# Patient Record
Sex: FEMALE | Race: OTHER | HISPANIC OR LATINO | URBAN - METROPOLITAN AREA
[De-identification: names, ages, dates, MRNs, and addresses within clinical notes are randomized per-mention and may not be internally consistent; named-entity substitution may affect disease eponyms.]

---

## 2022-04-10 ENCOUNTER — EMERGENCY (EMERGENCY)
Facility: HOSPITAL | Age: 47
LOS: 1 days | Discharge: HOME | End: 2022-04-10
Attending: EMERGENCY MEDICINE
Payer: SELF-PAY

## 2022-04-10 VITALS
OXYGEN SATURATION: 99 % | SYSTOLIC BLOOD PRESSURE: 100 MMHG | RESPIRATION RATE: 18 BRPM | TEMPERATURE: 98 F | DIASTOLIC BLOOD PRESSURE: 70 MMHG | HEART RATE: 68 BPM

## 2022-04-10 VITALS
RESPIRATION RATE: 18 BRPM | HEIGHT: 63 IN | HEART RATE: 73 BPM | WEIGHT: 179.9 LBS | DIASTOLIC BLOOD PRESSURE: 73 MMHG | OXYGEN SATURATION: 100 % | SYSTOLIC BLOOD PRESSURE: 121 MMHG | TEMPERATURE: 98 F

## 2022-04-10 PROCEDURE — 99283 EMERGENCY DEPT VISIT LOW MDM: CPT

## 2022-04-10 PROCEDURE — 99284 EMERGENCY DEPT VISIT MOD MDM: CPT

## 2022-04-10 RX ORDER — ACETAMINOPHEN 500 MG
975 TABLET ORAL ONCE
Refills: 0 | Status: COMPLETED | OUTPATIENT
Start: 2022-04-10 | End: 2022-04-10

## 2022-04-10 RX ORDER — DIAZEPAM 5 MG
5 TABLET ORAL ONCE
Refills: 0 | Status: DISCONTINUED | OUTPATIENT
Start: 2022-04-10 | End: 2022-04-10

## 2022-04-10 RX ORDER — IBUPROFEN 200 MG
600 TABLET ORAL ONCE
Refills: 0 | Status: COMPLETED | OUTPATIENT
Start: 2022-04-10 | End: 2022-04-10

## 2022-04-10 RX ORDER — LIDOCAINE 4 G/100G
1 CREAM TOPICAL ONCE
Refills: 0 | Status: COMPLETED | OUTPATIENT
Start: 2022-04-10 | End: 2022-04-10

## 2022-04-10 RX ORDER — METHOCARBAMOL 500 MG/1
1500 TABLET, FILM COATED ORAL ONCE
Refills: 0 | Status: COMPLETED | OUTPATIENT
Start: 2022-04-10 | End: 2022-04-10

## 2022-04-10 RX ORDER — METHOCARBAMOL 500 MG/1
2 TABLET, FILM COATED ORAL
Qty: 30 | Refills: 0
Start: 2022-04-10 | End: 2022-04-14

## 2022-04-10 RX ADMIN — Medication 5 MILLIGRAM(S): at 12:28

## 2022-04-10 RX ADMIN — Medication 600 MILLIGRAM(S): at 12:28

## 2022-04-10 RX ADMIN — Medication 975 MILLIGRAM(S): at 12:28

## 2022-04-10 RX ADMIN — METHOCARBAMOL 1500 MILLIGRAM(S): 500 TABLET, FILM COATED ORAL at 14:14

## 2022-04-10 RX ADMIN — LIDOCAINE 1 PATCH: 4 CREAM TOPICAL at 12:31

## 2022-04-10 NOTE — ED PROVIDER NOTE - PATIENT PORTAL LINK FT
You can access the FollowMyHealth Patient Portal offered by [facility] by registering at the following website: [email or web address]. By joining [employer]’s FollowMyHealth portal, you will also be able to view your health information using other applications (apps) compatible with our system.

## 2022-04-10 NOTE — ED ADULT NURSE NOTE - OBJECTIVE STATEMENT
47 year old female domes in today for left gluteal pain.  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  SunitaN

## 2022-04-10 NOTE — ED PROVIDER NOTE - PROGRESS NOTE DETAILS
Attending MD Allen: Feels improved, will Rx Robaxin.  Follow up instructions given, importance of follow up emphasized, return to ED parameters reviewed and patient verbalized understanding.  All questions answered, all concerns addressed. Attending MD Allen: Reassessed, reports persistent pain, will give Robaxin, reassess.

## 2022-04-10 NOTE — ED PROVIDER NOTE - ATTENDING CONTRIBUTION TO CARE
Attending MD Allen: I personally have seen and examined this patient.  Resident note reviewed and agree on plan of care and except where noted.  See below for details.     Seen in [facility]    47F with no reported PMH/PSH/Meds, no known drug allergies presents to the ED with back pain.  Reports that she was bending to pick something up while at work on Thursday and as she straightened up she felt a sudden "pulling" and pain in her L lower back.  Reports that she had a similar episode about 1-1.5 yrs ago, reports she was imaged but does not remember which type of imaging, doubts MRI.  Denies numbness, weakness or tingling in extremities. Denies loss of urinary or bowel continence. Denies urinary complaints.  Denies chest pain, shortness of breath, palpitations. Denies abdominal pain, nausea, vomiting, diarrhea.  Denies fevers, chills.  Denies history of malignancy, injections into back, IVDU, aortic pathology.  A ten (10) point review of systems was negative other than as stated in the HPI or elsewhere in the chart.     Exam:   General: NAD  HENT: head NCAT, airway patent  Eyes: no conjunctival injection   Lungs: lungs CTAB with good inspiratory effort, no wheezing, no rhonchi, no rales  Cardiac: +S1S2, no m/r/g  GI: abdomen soft with +BS, NT, ND  : no CVAT  MSK: FROM at neck, no tenderness to midline palpation, no stepoffs along length of spine, +paralumbar tenderness on LEFT, +worsening of pain bilaterally with SLR, no overlying ecchymosis or rash  Neuro: moving all extremities spontaneously with 5/5 strength, sensory grossly intact, no gross neuro deficits, no saddle anesthesia  Psych: normal mood and affect     A/P: 47F with back pain, no red flags, suspect MSK, possible disc herniation, will give pain control, reassess

## 2022-04-10 NOTE — ED PROVIDER NOTE - CLINICAL SUMMARY MEDICAL DECISION MAKING FREE TEXT BOX
Conrado Acevedo MD (PGY-2): The patient is a 47y Female with no sig pmhx c/o left lower back pain. Most likely MSK back pain, possible herniated disk. Pain control and reassess. Will have pt ambulate after meds. Most likely d/c home.

## 2022-04-10 NOTE — ED ADULT NURSE NOTE - PAIN: PRESENCE, MLM
complains of pain/discomfort Slit Excision Additional Text (Leave Blank If You Do Not Want): A linear line was drawn on the skin overlying the lesion. An incision was made slowly until the lesion was visualized.  Once visualized, the lesion was removed with blunt dissection.

## 2022-04-10 NOTE — ED PROVIDER NOTE - NSFOLLOWUPCLINICS_GEN_ALL_ED_FT
[facility]  Ortho/Spine  [address] [place] [id number]  Phone: [phone]  Fax:   Follow Up Time: 1-3 Days

## 2022-04-10 NOTE — ED PROVIDER NOTE - NSFOLLOWUPINSTRUCTIONS_ED_ALL_ED_FT
YOU WERE SEEN IN THE ED FOR: back pain    WHILE YOU WERE HERE, YOU HAD: Tylenol 975mg, Diazepam 5mg, Ibuprofen 600mg, Lidocaine 4% patch and Robaxin 1500mg  YOU WERE PRESCRIBED: Robaxin  FOLLOW THE INSTRUCTIONS ON THE LABEL/CONTAINER    FOR PAIN, YOU MAY TAKE TYLENOL (Acetaminophen) AND/OR IBUPROFEN (Advil or Motrin). FOLLOW THE INSTRUCTIONS ON THE LABEL/CONTAINER.  DO NOT EXCEED 4000MG OF TYLENOL IN A 24 HOUR PERIOD.  TAKE IBUPROFEN WITH FOOD.    PLEASE FOLLOW UP WITH YOUR PRIVATE PHYSICIAN WITHIN THE NEXT 24-72 HOURS. BRING COPIES OF YOUR RESULTS.  PLEASE CALL TO SET UP APPOINTMENT WITH SPINE CENTER FOR FOLLOW UP.    RETURN TO THE EMERGENCY DEPARTMENT IF YOU EXPERIENCE ANY NEW/CONCERNING/WORSENING SYMPTOMS SUCH AS BUT NOT LIMITED TO: numbness, weakness or tingling in extremities, loss of urinary or bowel continence.

## 2022-04-10 NOTE — ED PROVIDER NOTE - PHYSICAL EXAMINATION
Gen: +In mild distress. A&Ox4. Non-toxic appearing.  HEENT: Normocephalic and atraumatic. PERRL, EOMI, no nasal discharge, mucous membranes moist, no scleral icterus.  CV: Regular rate and rhythm, +S1/S2, no M/R/G. No significant lower extremity edema. Radial and DP pulses present and symmetrical. Capillary refill less than 2 seconds.  Resp: Normal effort and rate. CTAB, no rales, rhonchi, or wheezes.  GI: Abdomen soft, non-distended, non tender to palpation. No masses appreciated. Bowel sounds present.  MSK: No open wounds and no bruising. No CVAT bilaterally. +Left lumbar paraspinal tenderness. No midline spinal tenderness. Straight leg raise negative b/l.  Neuro: Following commands, speaking in full sentences, moving extremities spontaneously. CN II-XII intact. Strength and sensation symmetric and intact throughout. Reflexes 2+ throughout. Cerebellar testing normal.  Psych: Appropriate mood, cooperative

## 2022-04-10 NOTE — ED PROVIDER NOTE - OBJECTIVE STATEMENT
The patient is a 47y Female with no sig pmhx c/o left lower back pain. Pt is , says that 3 days ago, was bending down and when she got up developed sharp L lower back pain. Pain has persisted since then with increasing difficulty ambulating. No urinary changes, hx of malignancy, fever, steroid use. Took Tylenol last night at 1am, took ibuprofen yesterday. No hx of spinal surgeries or disc herniation. NKDA.